# Patient Record
Sex: MALE | Race: WHITE
[De-identification: names, ages, dates, MRNs, and addresses within clinical notes are randomized per-mention and may not be internally consistent; named-entity substitution may affect disease eponyms.]

---

## 2019-09-06 ENCOUNTER — HOSPITAL ENCOUNTER (EMERGENCY)
Dept: HOSPITAL 11 - JP.ED | Age: 24
Discharge: HOME | End: 2019-09-06
Payer: MEDICAID

## 2019-09-06 DIAGNOSIS — Z79.899: ICD-10-CM

## 2019-09-06 DIAGNOSIS — F17.210: ICD-10-CM

## 2019-09-06 DIAGNOSIS — S80.12XA: ICD-10-CM
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
## 2019-09-06 DIAGNOSIS — S00.431A: Primary | ICD-10-CM

## 2019-09-06 NOTE — EDM.PDOC
ED HPI GENERAL MEDICAL PROBLEM





- General


Chief Complaint: Lower Extremity Injury/Pain


Stated Complaint: MEDICAL VIA NORTH


Time Seen by Provider: 09/06/19 20:42


Source of Information: Reports: Patient, Family


History Limitations: Reports: No Limitations





- History of Present Illness


INITIAL COMMENTS - FREE TEXT/NARRATIVE: 





This patient arrived by EMS after being thrown off a horse. He said he was 

riding and the horse refused to go forward and when he kicked it with his heels 

it reared up and threw him off the back. He hit the right side of his head 

against a wooden fence post. He said he was stunned and may have been knocked 

out for a second or 2. His wife or female friend agrees. She said that on the 

way to the hospital he kept asking the same questions repeatedly so she thought 

he might be a little bit confused. She said he seems back to normal now.





The patient also said he hurt his left lower leg however the initial exam didn'

t seem to indicate much of a problem with it. When rechecked after the CT of 

his head he said that he is not able to walk on the left leg and that the horse 

stepped on it that is the upper inner part of his left calf. He said a few 

weeks ago that area also was stepped on by a horse and it was swollen for a 

long time and kind of really never got better but then tonight the horse 

stepped on the same place again.





- Related Data


 Allergies











Allergy/AdvReac Type Severity Reaction Status Date / Time


 


No Known Allergies Allergy   Verified 09/06/19 20:32











Home Meds: 


 Home Meds





busPIRone HCl [Buspirone HCl] 15 mg PO DAILY 09/06/19 [History]











Past Medical History


Musculoskeletal History: Reports: Fracture





Social & Family History





- Family History


Family Medical History: Noncontributory





- Tobacco Use


Smoking Status *Q: Current Every Day Smoker


Years of Tobacco use: 12


Packs/Tins Daily: 0.5


Used Tobacco, but Quit: No





- Caffeine Use


Caffeine Use: Reports: None





- Alcohol Use


Days Per Week of Alcohol Use: 2


Number of Drinks Per Day: 6


Total Drinks Per Week: 12





- Recreational Drug Use


Recreational Drug Use: Yes


Recreational Drug Type: Reports: Marijuana/Hashish


Recreational Drug Use Frequency: Rarely





Review of Systems





- Review of Systems


Review Of Systems: ROS reveals no pertinent complaints other than HPI.





ED EXAM, GENERAL





- Physical Exam


Exam: See Below


Exam Limited By: No Limitations


General Appearance: Alert, WD/WN, Mild Distress


Eye Exam: Bilateral Eye: EOMI, PERRL


Ears: Normal External Exam


Nose: Normal Inspection


Throat/Mouth: Normal Inspection


Head: Other (There is a small contusion just above and posterior to the right 

ear)


Neck: Supple, Non-Tender


Respiratory/Chest: No Respiratory Distress


Cardiovascular: Regular Rate, Rhythm


Extremities: Other (There is swelling and firmness to the left lower leg the 

anteromedial part just distal to the knee. That area is very tender. In fact 

much of the upper left calf is tender. He does however have good range of 

motion of the foot and ankle and there does not appear to be any chance of a 

compartment syndrome. Neurovascular tendon all intact)


Neurological: No Motor/Sensory Deficits





Course





- Vital Signs


Last Recorded V/S: 





 Last Vital Signs











Temp  36.1 C   09/06/19 20:35


 


Pulse  94   09/06/19 20:35


 


Resp  16   09/06/19 20:35


 


BP  123/83   09/06/19 20:35


 


Pulse Ox  97   09/06/19 20:35














- Radiology Interpretation


Free Text/Narrative:: 





Head CT shows no acute injury. X-ray of the left tib-fib shows no evidence of 

fracture.





- Re-Assessments/Exams


Free Text/Narrative Re-Assessment/Exam: 





09/06/19 23:11


I explained carefully the situation with compartment syndrome and that I do not 

think he has a compartment syndrome but that if he began having severe pain 

with just moving his foot back and forth then that could signal buildup of 

pressure and he needs to be seen immediately in the emergency department. An 

Ace wrap was applied. He was fitted with crutches





Departure





- Departure


Time of Disposition: 23:27


Disposition: Home, Self-Care 01


Condition: Fair


Clinical Impression: 


 Minor closed head injury, Contusion of left calf








- Discharge Information


Referrals: 


PCP,None [Primary Care Provider] - 


Additional Instructions: 


Use crutches as needed. You may bear weight as tolerated but it is best to keep 

it elevated as much as possible. Apply ice frequently.


Watch for signs of a compartment syndrome which is where blood gets cut off to 

the muscles because of swelling. If that happened he would have severe pain 

every time you flexed or extended your ankle. If that happens that's an 

emergency and you need to be seen immediately. Your exam shows you don't have 

that.





Use the pain medication, Narco 5/325, #12 tablets, one or 2 every 4 hours. This 

medication can cause sedation and impair driving and can lead to addiction.

## 2019-09-06 NOTE — CRLCT
INDICATION:



Concussion



TECHNIQUE:



CT head without contrast.



COMPARISON:



None. 



FINDINGS:



CSF spaces: Within normal limits for age.  



Brain parenchyma and extra-axial spaces: The gray-white differentiation is 

normal.  No sign of mass, hemorrhage, or midline shift. No extra-axial 

fluid collection.  



Skull base and calvarium: The visualized paranasal sinuses and mastoid air 

cells demonstrate no acute or significant findings.  The visualized orbits 

are grossly unremarkable.  No skull fractures.  



IMPRESSION:



Unremarkable noncontrast head CT. No signs of acute injury.



Dictated by Glen King MD @ 9/6/2019 9:56:26 PM



Please note that all CT scans at this facility use dose modulation, 

iterative reconstruction, and/or weight-based dosing when appropriate to 

reduce radiation dose to as low as reasonably achievable.



Dictated by: Glen King MD @ 09/06/2019 21:56:37



(Electronically Signed)

## 2019-09-06 NOTE — CRLCR
INDICATION:



Trauma  



TECHNIQUE:



Two views left tibia and fibula 



COMPARISON:



None



FINDINGS:



Bones: Alignment is normal. No fractures or bone lesions.  



Joint spaces: Unremarkable.  



Soft tissues: Unremarkable.  



IMPRESSION:



Negative.



Dictated by Mikel Lopez MD @ 9/6/2019 11:02:56 PM



Dictated by: Mikel Lopez MD @ 09/06/2019 23:03:01



(Electronically Signed)